# Patient Record
Sex: FEMALE | Race: WHITE | NOT HISPANIC OR LATINO | Employment: PART TIME | URBAN - NONMETROPOLITAN AREA
[De-identification: names, ages, dates, MRNs, and addresses within clinical notes are randomized per-mention and may not be internally consistent; named-entity substitution may affect disease eponyms.]

---
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
## 2020-12-19 PROCEDURE — 87636 SARSCOV2 & INF A&B AMP PRB: CPT | Performed by: NURSE PRACTITIONER

## 2021-03-31 ENCOUNTER — TELEPHONE (OUTPATIENT)
Dept: FAMILY MEDICINE CLINIC | Facility: CLINIC | Age: 21
End: 2021-03-31

## 2021-04-01 NOTE — TELEPHONE ENCOUNTER
Please ask pt what medical issues she has and what meds she is on to know what labs are needed - if no known medical illness will get screening labs

## 2021-04-07 NOTE — TELEPHONE ENCOUNTER
Spoke with patient who reports she only takes progeterone. Patient reports she has concerns that her hormones need to be tested. Advised patient to discuss issues at appt on 04/20/2021. Patient will discuss issues at appointment and have labs drawn after appointment. Patient reports a recent lipid test.

## 2021-04-20 ENCOUNTER — OFFICE VISIT (OUTPATIENT)
Dept: FAMILY MEDICINE CLINIC | Facility: CLINIC | Age: 21
End: 2021-04-20

## 2021-04-20 VITALS
HEART RATE: 94 BPM | WEIGHT: 170 LBS | TEMPERATURE: 98.4 F | RESPIRATION RATE: 16 BRPM | DIASTOLIC BLOOD PRESSURE: 82 MMHG | OXYGEN SATURATION: 98 % | SYSTOLIC BLOOD PRESSURE: 122 MMHG | HEIGHT: 66 IN | BODY MASS INDEX: 27.32 KG/M2

## 2021-04-20 DIAGNOSIS — Z12.4 CERVICAL CANCER SCREENING: ICD-10-CM

## 2021-04-20 DIAGNOSIS — Z23 NEED FOR VACCINATION: ICD-10-CM

## 2021-04-20 DIAGNOSIS — E28.2 PCOS (POLYCYSTIC OVARIAN SYNDROME): ICD-10-CM

## 2021-04-20 DIAGNOSIS — Z00.00 WELL ADULT EXAM: Primary | ICD-10-CM

## 2021-04-20 PROCEDURE — 90471 IMMUNIZATION ADMIN: CPT | Performed by: FAMILY MEDICINE

## 2021-04-20 PROCEDURE — 99385 PREV VISIT NEW AGE 18-39: CPT | Performed by: FAMILY MEDICINE

## 2021-04-20 PROCEDURE — 90715 TDAP VACCINE 7 YRS/> IM: CPT | Performed by: FAMILY MEDICINE

## 2021-04-20 NOTE — PROGRESS NOTES
"Subjective  cc: adult well exam   John Becker is a 21 y.o. female who presents for adult well exam.   PCOS - sees GYN - on oral progesterone - she is interested in getting her hormones checked again now that hse is on hormones - reports her periods are still irregular.    She is trying to switch to [employer] for GYN    No other known medical issues   Non smoker     FH: great grandparents with colon cancer   Mother does have breast cancer gene as well       History of Present Illness     The following portions of the patient's history were reviewed and updated as appropriate: allergies, current medications, past family history, past medical history, past social history, past surgical history and problem list.        Review of Systems   Genitourinary: Positive for menstrual problem.   All other systems reviewed and are negative.      Objective   Blood pressure 122/82, pulse 94, temperature 98.4 °F (36.9 °C), temperature source Infrared, resp. rate 16, height 167.6 cm (66\"), weight 77.1 kg (170 lb), SpO2 98 %.  Physical Exam  Vitals and nursing note reviewed.   Constitutional:       General: She is not in acute distress.     Appearance: She is well-developed. She is not diaphoretic.   HENT:      Head: Normocephalic and atraumatic.      Nose: Nose normal.   Eyes:      General:         Right eye: No discharge.         Left eye: No discharge.      Conjunctiva/sclera: Conjunctivae normal.   Neck:      Thyroid: No thyromegaly.      Trachea: No tracheal deviation.   Cardiovascular:      Rate and Rhythm: Regular rhythm. Tachycardia present.      Pulses: Normal pulses.      Heart sounds: Normal heart sounds.   Pulmonary:      Effort: Pulmonary effort is normal. No respiratory distress.      Breath sounds: Normal breath sounds. No stridor. No wheezing.   Chest:      Chest wall: No tenderness.   Abdominal:      General: There is no distension.      Palpations: Abdomen is soft.      Tenderness: There is no abdominal " tenderness.   Musculoskeletal:         General: Normal range of motion.      Cervical back: Normal range of motion.   Lymphadenopathy:      Cervical: No cervical adenopathy.   Skin:     General: Skin is warm and dry.   Neurological:      Mental Status: She is alert and oriented to person, place, and time.      Motor: No abnormal muscle tone.      Coordination: Coordination normal.   Psychiatric:         Mood and Affect: Mood normal.         Behavior: Behavior normal.         Thought Content: Thought content normal.         Judgment: Judgment normal.         Assessment/Plan   Problems Addressed this Visit     None      Visit Diagnoses     Well adult exam    -  Primary    Cervical cancer screening        Relevant Orders    Ambulatory Referral to Obstetrics / Gynecology    Need for vaccination        Relevant Orders    Tdap Vaccine Greater Than or Equal To 8yo IM (Completed)    PCOS (polycystic ovarian syndrome)          Diagnoses       Codes Comments    Well adult exam    -  Primary ICD-10-CM: Z00.00  ICD-9-CM: V70.0     Cervical cancer screening     ICD-10-CM: Z12.4  ICD-9-CM: V76.2     Need for vaccination     ICD-10-CM: Z23  ICD-9-CM: V05.9     PCOS (polycystic ovarian syndrome)     ICD-10-CM: E28.2  ICD-9-CM: 256.4           PLAN:     #1 adult well exam: reviewed screening, reviewed past labs, referral to OBGYN for pap smear - discuss hormone testing at that visit - if it is going to take too long to get in with GYN we can do testing here, will get tdap - discussed immunizations - she thinks she has already had HPV vaccines, advised on monitoring BP and DASH diet, pt to call with more info on her mothers breast cancer gene to determine when we should start screening for her            This document has been electronically signed by Dalia Oliveira MD on April 20, 2021 17:46 CDT

## 2021-04-20 NOTE — PATIENT INSTRUCTIONS
"https://www.nhlbi.nih.gov/files/docs/public/heart/dash_brief.pdf\">   DASH Eating Plan  DASH stands for Dietary Approaches to Stop Hypertension. The DASH eating plan is a healthy eating plan that has been shown to:  · Reduce high blood pressure (hypertension).  · Reduce your risk for type 2 diabetes, heart disease, and stroke.  · Help with weight loss.  What are tips for following this plan?  Reading food labels  · Check food labels for the amount of salt (sodium) per serving. Choose foods with less than 5 percent of the Daily Value of sodium. Generally, foods with less than 300 milligrams (mg) of sodium per serving fit into this eating plan.  · To find whole grains, look for the word \"whole\" as the first word in the ingredient list.  Shopping  · Buy products labeled as \"low-sodium\" or \"no salt added.\"  · Buy fresh foods. Avoid canned foods and pre-made or frozen meals.  Cooking  · Avoid adding salt when cooking. Use salt-free seasonings or herbs instead of table salt or sea salt. Check with your health care provider or pharmacist before using salt substitutes.  · Do not garg foods. Cook foods using healthy methods such as baking, boiling, grilling, roasting, and broiling instead.  · Cook with heart-healthy oils, such as olive, canola, avocado, soybean, or sunflower oil.  Meal planning    · Eat a balanced diet that includes:  ? 4 or more servings of fruits and 4 or more servings of vegetables each day. Try to fill one-half of your plate with fruits and vegetables.  ? 6-8 servings of whole grains each day.  ? Less than 6 oz (170 g) of lean meat, poultry, or fish each day. A 3-oz (85-g) serving of meat is about the same size as a deck of cards. One egg equals 1 oz (28 g).  ? 2-3 servings of low-fat dairy each day. One serving is 1 cup (237 mL).  ? 1 serving of nuts, seeds, or beans 5 times each week.  ? 2-3 servings of heart-healthy fats. Healthy fats called omega-3 fatty acids are found in foods such as walnuts, " flaxseeds, fortified milks, and eggs. These fats are also found in cold-water fish, such as sardines, salmon, and mackerel.  · Limit how much you eat of:  ? Canned or prepackaged foods.  ? Food that is high in trans fat, such as some fried foods.  ? Food that is high in saturated fat, such as fatty meat.  ? Desserts and other sweets, sugary drinks, and other foods with added sugar.  ? Full-fat dairy products.  · Do not salt foods before eating.  · Do not eat more than 4 egg yolks a week.  · Try to eat at least 2 vegetarian meals a week.  · Eat more home-cooked food and less restaurant, buffet, and fast food.  Lifestyle  · When eating at a restaurant, ask that your food be prepared with less salt or no salt, if possible.  · If you drink alcohol:  ? Limit how much you use to:  § 0-1 drink a day for women who are not pregnant.  § 0-2 drinks a day for men.  ? Be aware of how much alcohol is in your drink. In the [place] one drink equals one 12 oz bottle of beer (355 mL), one 5 oz glass of wine (148 mL), or one 1½ oz glass of hard liquor (44 mL).  General information  · Avoid eating more than 2,300 mg of salt a day. If you have hypertension, you may need to reduce your sodium intake to 1,500 mg a day.  · Work with your health care provider to maintain a healthy body weight or to lose weight. Ask what an ideal weight is for you.  · Get at least 30 minutes of exercise that causes your heart to beat faster (aerobic exercise) most days of the week. Activities may include walking, swimming, or biking.  · Work with your health care provider or dietitian to adjust your eating plan to your individual calorie needs.  What foods should I eat?  Fruits  All fresh, dried, or frozen fruit. Canned fruit in natural juice (without added sugar).  Vegetables  Fresh or frozen vegetables (raw, steamed, roasted, or grilled). Low-sodium or reduced-sodium tomato and vegetable juice. Low-sodium or reduced-sodium tomato sauce and tomato paste.  Low-sodium or reduced-sodium canned vegetables.  Grains  Whole-grain or whole-wheat bread. Whole-grain or whole-wheat pasta. Brown rice. Oatmeal. Quinoa. Bulgur. Whole-grain and low-sodium cereals. Kiersten bread. Low-fat, low-sodium crackers. Whole-wheat flour tortillas.  Meats and other proteins  Skinless chicken or turkey. Ground chicken or turkey. Pork with fat trimmed off. Fish and seafood. Egg whites. Dried beans, peas, or lentils. Unsalted nuts, nut butters, and seeds. Unsalted canned beans. Lean cuts of beef with fat trimmed off. Low-sodium, lean precooked or cured meat, such as sausages or meat loaves.  Dairy  Low-fat (1%) or fat-free (skim) milk. Reduced-fat, low-fat, or fat-free cheeses. Nonfat, low-sodium ricotta or cottage cheese. Low-fat or nonfat yogurt. Low-fat, low-sodium cheese.  Fats and oils  Soft margarine without trans fats. Vegetable oil. Reduced-fat, low-fat, or light mayonnaise and salad dressings (reduced-sodium). Canola, safflower, olive, avocado, soybean, and sunflower oils. Avocado.  Seasonings and condiments  Herbs. Spices. Seasoning mixes without salt.  Other foods  Unsalted popcorn and pretzels. Fat-free sweets.  The items listed above may not be a complete list of foods and beverages you can eat. Contact a dietitian for more information.  What foods should I avoid?  Fruits  Canned fruit in a light or heavy syrup. Fried fruit. Fruit in cream or butter sauce.  Vegetables  Creamed or fried vegetables. Vegetables in a cheese sauce. Regular canned vegetables (not low-sodium or reduced-sodium). Regular canned tomato sauce and paste (not low-sodium or reduced-sodium). Regular tomato and vegetable juice (not low-sodium or reduced-sodium). Pickles. Olives.  Grains  Baked goods made with fat, such as croissants, muffins, or some breads. Dry pasta or rice meal packs.  Meats and other proteins  Fatty cuts of meat. Ribs. Fried meat. Plata. Bologna, salami, and other precooked or cured meats, such as  sausages or meat loaves. Fat from the back of a pig (fatback). Bratwurst. Salted nuts and seeds. Canned beans with added salt. Canned or smoked fish. Whole eggs or egg yolks. Chicken or turkey with skin.  Dairy  Whole or 2% milk, cream, and half-and-half. Whole or full-fat cream cheese. Whole-fat or sweetened yogurt. Full-fat cheese. Nondairy creamers. Whipped toppings. Processed cheese and cheese spreads.  Fats and oils  Butter. Stick margarine. Lard. Shortening. Ghee. Plata fat. Tropical oils, such as coconut, palm kernel, or palm oil.  Seasonings and condiments  Onion salt, garlic salt, seasoned salt, table salt, and sea salt. Worcestershire sauce. Tartar sauce. Barbecue sauce. Teriyaki sauce. Soy sauce, including reduced-sodium. Steak sauce. Canned and packaged gravies. Fish sauce. Oyster sauce. Cocktail sauce. Store-bought horseradish. Ketchup. Mustard. Meat flavorings and tenderizers. Bouillon cubes. Hot sauces. Pre-made or packaged marinades. Pre-made or packaged taco seasonings. Relishes. Regular salad dressings.  Other foods  Salted popcorn and pretzels.  The items listed above may not be a complete list of foods and beverages you should avoid. Contact a dietitian for more information.  Where to find more information  · National Heart, Lung, and Blood [place]: www.nhlbi.nih.gov  · American Heart Association: www.heart.org  · Academy of Nutrition and Dietetics: www.eatright.org  · National Kidney Foundation: www.kidney.org  Summary  · The DASH eating plan is a healthy eating plan that has been shown to reduce high blood pressure (hypertension). It may also reduce your risk for type 2 diabetes, heart disease, and stroke.  · When on the DASH eating plan, aim to eat more fresh fruits and vegetables, whole grains, lean proteins, low-fat dairy, and heart-healthy fats.  · With the DASH eating plan, you should limit salt (sodium) intake to 2,300 mg a day. If you have hypertension, you may need to reduce your  sodium intake to 1,500 mg a day.  · Work with your health care provider or dietitian to adjust your eating plan to your individual calorie needs.  This information is not intended to replace advice given to you by your health care provider. Make sure you discuss any questions you have with your health care provider.  Document Revised: 11/20/2020 Document Reviewed: 11/20/2020  [employer] Patient Education © 2021 [employer]

## 2021-05-06 ENCOUNTER — OFFICE VISIT (OUTPATIENT)
Dept: OBSTETRICS AND GYNECOLOGY | Facility: CLINIC | Age: 21
End: 2021-05-06

## 2021-05-06 VITALS
BODY MASS INDEX: 26.84 KG/M2 | WEIGHT: 167 LBS | HEIGHT: 66 IN | SYSTOLIC BLOOD PRESSURE: 134 MMHG | DIASTOLIC BLOOD PRESSURE: 76 MMHG

## 2021-05-06 DIAGNOSIS — Z01.419 ENCOUNTER FOR GYNECOLOGICAL EXAMINATION WITHOUT ABNORMAL FINDING: Primary | ICD-10-CM

## 2021-05-06 DIAGNOSIS — N92.6 IRREGULAR PERIODS: ICD-10-CM

## 2021-05-06 DIAGNOSIS — Z80.3 FAMILY HISTORY OF BREAST CANCER: ICD-10-CM

## 2021-05-06 PROCEDURE — 99385 PREV VISIT NEW AGE 18-39: CPT | Performed by: OBSTETRICS & GYNECOLOGY

## 2021-05-06 RX ORDER — PROGESTERONE 100 MG/1
100 CAPSULE ORAL DAILY
Qty: 10 CAPSULE | Refills: 12 | Status: SHIPPED | OUTPATIENT
Start: 2021-05-06 | End: 2022-05-25 | Stop reason: SDUPTHER

## 2021-05-06 NOTE — PROGRESS NOTES
"CC: annual exam    SUBJECTIVE: John Becker is a 21 y.o. female , para 0, who comes to the office today for annual GYN examination. Last menstrual period was 4 weeks ago and her last Pap smear was 4/2019, and was normal. She has no history of cervical dysplasia. She is currently on cyclic Prometrium for cycle regulation due to a history of PCOS. Her natural cycles are about every 3 months. Her medical history is reviewed.     Family history is positive for maternal grandmother with breast cancer at age 49, John's mother tested positive for BRCA    HPI      Social History     Tobacco Use   • Smoking status: Never Smoker   • Smokeless tobacco: Never Used   Vaping Use   • Vaping Use: Never used   Substance Use Topics   • Alcohol use: No   • Drug use: No      Review of Systems   Constitutional: Negative for fever.   Respiratory: Negative for cough.    Genitourinary: Positive for menstrual problem.   Hematological: Does not bruise/bleed easily.     Visit Vitals  /76 (BP Location: Left arm, Patient Position: Sitting, Cuff Size: Adult)   Ht 167.6 cm (66\")   Wt 75.8 kg (167 lb)   LMP 04/11/2021   Breastfeeding No   BMI 26.95 kg/m²      Objective   Physical Exam  Vitals and nursing note reviewed.   Constitutional:       General: She is not in acute distress.     Appearance: She is well-developed.   HENT:      Head: Normocephalic and atraumatic.   Cardiovascular:      Rate and Rhythm: Normal rate and regular rhythm.      Heart sounds: No murmur heard.     Pulmonary:      Effort: Pulmonary effort is normal.      Breath sounds: Normal breath sounds.   Abdominal:      General: There is no distension.      Palpations: Abdomen is soft.      Tenderness: There is no abdominal tenderness.   Genitourinary:     General: Normal vulva.      Exam position: Lithotomy position.      Labia:         Right: No tenderness or lesion.         Left: No tenderness or lesion.       Vagina: Normal. No vaginal discharge, tenderness or " bleeding.      Cervix: No cervical motion tenderness, discharge or friability.      Adnexa:         Right: No tenderness or fullness.          Left: No tenderness or fullness.     Musculoskeletal:         General: Normal range of motion.      Cervical back: Normal range of motion and neck supple.   Skin:     General: Skin is warm and dry.   Neurological:      Mental Status: She is alert and oriented to person, place, and time.   Psychiatric:         Behavior: Behavior normal.         Judgment: Judgment normal.       Assessment/Plan   Diagnoses and all orders for this visit:    1. Encounter for gynecological examination without abnormal finding (Primary)    2. Family history of breast cancer  -     Ambulatory Referral to Genetic Counseling/Testing - [facility]    3. Irregular periods  -     Progesterone (Prometrium) 100 MG capsule; Take 1 capsule by mouth Daily.  Dispense: 10 capsule; Refill: 12      I have refilled her progestin for a year.  We have discussed current Pap smear screening guidelines.  She will return in one year. In the meantime if she develops questions or problems, she will notify the office.

## 2021-05-13 ENCOUNTER — APPOINTMENT (OUTPATIENT)
Dept: ONCOLOGY | Facility: HOSPITAL | Age: 21
End: 2021-05-13

## 2021-05-13 ENCOUNTER — DOCUMENTATION (OUTPATIENT)
Dept: GENETICS | Facility: HOSPITAL | Age: 21
End: 2021-05-13

## 2021-05-14 NOTE — PROGRESS NOTES
John Becker, a 21-year-old female, was referred for genetic counseling due to a family history of breast cancer.  Genetic counseling was provided via telehealth.  She was 9 years old at menarche and is nulliparous.  She is premenopausal and retains her uterus and ovaries.  She has not had any prior breast imaging.  She was interested in discussing her risk for a hereditary cancer syndrome.  Ms. Becker was interested in pursuing comprehensive genetic testing to evaluate her risk of cancer, therefore the CancerNext panel was ordered through [employer], which analyzes 36 genes associated with an increased cancer risk. Results are expected in approximately 2-3 weeks.      PERTINENT FAMILY HISTORY: (See attached pedigree)   Mat. Grandmother:  Breast cancer, 40s  Mat. Great-Grandmother: Colon cancer, 69      Liver cancer      Thyroid cancer, 60  Pat. Grandfather:  Melanoma, 62  Mother:   Reported BRCA mutation, copy of results not available.     RISK ASSESSMENT:  Ms. Becker’s family history of early onset breast cancer and a reported BRCA mutation in her mother raised the question of a hereditary cancer syndrome. Ms. Becker clearly meets NCCN guidelines criteria for BRCA1/2 testing.  Based on the presence of other clinically significant breast cancer related genes and in the absence of having a copy of a relatives result identifying the specific mutation, the standard approach to hereditary cancer genetic testing at this time is via multi-gene panel, which evaluates for BRCA1/2 as well as several additional genes associated with a hereditary risk for breast cancer and other cancers. Based on her mother’s reported BRCA mutation, Ms. Becker would have a 50% chance of having a BRCA mutation.  If genetic testing is negative, management should be guided by a family history-based risk assessment.     GENETIC COUNSELING:  We reviewed the family history information in detail. Cases of cancer follow three general patterns: sporadic,  familial, and hereditary.  While most cancer is sporadic, some cases appear to occur in family clusters.  These cases are said to be familial and account for 10-20% of cancer cases.  Familial cases may be due to a combination of shared genes and environmental factors among family members.  In even fewer families, the cancer is said to be inherited, and the genes responsible for the cancer are known.       Family histories typical of hereditary cancer syndromes usually include multiple first- and second-degree relatives diagnosed with cancer types that define a syndrome.  These cases tend to be diagnosed at younger-than-expected ages and can be bilateral or multifocal.  The cancer in these families follows an autosomal dominant inheritance pattern, which indicates the likely presence of a mutation in a cancer susceptibility gene.  Children and siblings of an individual believed to carry this mutation have a 50% chance of inheriting that mutation, thereby inheriting the increased risk to develop cancer.  These mutations can be passed down from the maternal or the paternal lineage.     Hereditary breast and ovarian cancer accounts for 5-10% of all cases of breast cancer or ovarian cancer.  A significant proportion (50%) of hereditary breast, ovarian, or pancreatic cancer can be attributed to mutations in the BRCA1 and BRCA2 genes.  Mutations in these genes confer an increased risk for breast cancer, ovarian cancer, male breast cancer, prostate cancer, and pancreatic cancer.  Women with a BRCA1 or BRCA2 mutation have up to an 87% lifetime risk of breast cancer and up to a 44% risk of ovarian cancer.  There are other clinically significant breast cancer related genes in addition to BRCA1/2, including PALB2, YOVANI, and CHEK2.  There are other, more rare, hereditary cancer syndromes. Based on Ms. Becker’s family history and her desire to get more information regarding her personal risks, she opted to pursue testing through a  panel evaluating several other genes known to increase the risk for cancer.     GENETIC TESTING:  The risks, benefits, and limitations of genetic testing and implications for clinical management following testing were reviewed.  DNA test results can influence decisions regarding screening and prevention.  Genetic testing can have significant psychological implications for both individuals and families. Also discussed was the possibility of employment and insurance discrimination based on genetic test results and the laws in place to prevent this, as well as the limitations of these laws.       We discussed panel testing, which would involve testing 34 genes associated with increased cancer risk. The implications of a positive or negative test result were discussed.  We also discussed the importance of testing on an affected relative. In general, a negative genetic test result is most informative if a mutation has first been established in an affected member of the family.  In cases where an affected individual is not available or interested in testing, it is appropriate to offer testing to an unaffected individual. We discussed the possibility that, in some cases, genetic test results may be ambiguous due to the identification of a genetic variant. These variants may or may not be associated with an increased cancer risk. With multigene panel testing, it is not uncommon for a variant of uncertain significance (VUS) to be identified.  If a VUS is identified, testing family members is typically not recommended and screening recommendations are made based on the family history.  The laboratories that perform genetic testing work to reclassify the VUS and send out an amended report if and when a VUS is reclassified.  The majority of variant findings are ultimately reclassified to a negative result. Given her family history, a negative test result does not eliminate all cancer risk, although the risk would not be as high  as it would with positive genetic testing. We also discussed that some of the genes on this particular panel have not been well studied yet and there may not be clear implications or guidelines for some of the genes included on this comprehensive panel.     PLAN: Genetic testing via the CancerNext panel through [employer] was ordered and results are expected in 2-3 weeks.   We will contact Ms. Becker with her results once they are received.      Zakia Greene MS, [facility] [place] [facility]  Licensed Certified Genetic Counselor

## 2021-05-24 ENCOUNTER — DOCUMENTATION (OUTPATIENT)
Dept: GENETICS | Facility: HOSPITAL | Age: 21
End: 2021-05-24

## 2021-06-29 DIAGNOSIS — F32.A DEPRESSION, UNSPECIFIED DEPRESSION TYPE: Primary | ICD-10-CM

## 2021-07-27 ENCOUNTER — TELEMEDICINE (OUTPATIENT)
Dept: PSYCHIATRY | Facility: CLINIC | Age: 21
End: 2021-07-27

## 2021-07-27 DIAGNOSIS — F41.1 GENERALIZED ANXIETY DISORDER: ICD-10-CM

## 2021-07-27 DIAGNOSIS — F33.2 SEVERE EPISODE OF RECURRENT MAJOR DEPRESSIVE DISORDER, WITHOUT PSYCHOTIC FEATURES (HCC): Primary | ICD-10-CM

## 2021-07-27 PROCEDURE — 90792 PSYCH DIAG EVAL W/MED SRVCS: CPT | Performed by: NURSE PRACTITIONER
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
## 2021-08-28 PROCEDURE — U0004 COV-19 TEST NON-CDC HGH THRU: HCPCS | Performed by: NURSE PRACTITIONER

## 2021-09-03 ENCOUNTER — TELEMEDICINE (OUTPATIENT)
Dept: FAMILY MEDICINE CLINIC | Facility: CLINIC | Age: 21
End: 2021-09-03

## 2021-09-03 ENCOUNTER — TELEPHONE (OUTPATIENT)
Dept: FAMILY MEDICINE CLINIC | Facility: CLINIC | Age: 21
End: 2021-09-03

## 2021-09-03 DIAGNOSIS — J32.9 RHINOSINUSITIS: ICD-10-CM

## 2021-09-03 DIAGNOSIS — J20.9 ACUTE BRONCHITIS, UNSPECIFIED ORGANISM: Primary | ICD-10-CM

## 2021-09-03 DIAGNOSIS — J31.0 RHINOSINUSITIS: ICD-10-CM

## 2021-09-03 PROCEDURE — 99213 OFFICE O/P EST LOW 20 MIN: CPT | Performed by: NURSE PRACTITIONER

## 2021-09-03 RX ORDER — BENZONATATE 100 MG/1
100 CAPSULE ORAL 3 TIMES DAILY PRN
Qty: 21 CAPSULE | Refills: 0 | Status: SHIPPED | OUTPATIENT
Start: 2021-09-03 | End: 2022-03-21

## 2021-09-03 RX ORDER — METHYLPREDNISOLONE 4 MG/1
TABLET ORAL
Qty: 21 TABLET | Refills: 0 | Status: SHIPPED | OUTPATIENT
Start: 2021-09-03 | End: 2021-09-22

## 2021-09-03 RX ORDER — ALBUTEROL SULFATE 90 UG/1
2 AEROSOL, METERED RESPIRATORY (INHALATION) EVERY 4 HOURS PRN
Qty: 18 G | Refills: 0 | Status: SHIPPED | OUTPATIENT
Start: 2021-09-03 | End: 2021-09-22

## 2021-09-03 RX ORDER — AMOXICILLIN AND CLAVULANATE POTASSIUM 875; 125 MG/1; MG/1
1 TABLET, FILM COATED ORAL 2 TIMES DAILY
Qty: 20 TABLET | Refills: 0 | Status: SHIPPED | OUTPATIENT
Start: 2021-09-03 | End: 2021-09-22

## 2021-09-03 NOTE — PROGRESS NOTES
Chief Complaint  URI (for 10 days, getting worse)    Subjective          John Becker presents via mychart video zoom visit with [facility] for uri.  History of Present Illness  Uri  Started 10 days ago.   Sore throat and Dry cough then changed to wet cough  Headache  Eyes feel sore is itching. Eyes matting.  Drainage is dark and bloody  Keeping her up at night  Having coughing fits   Decreased taste some    No shortness of breath, n/v/d  Negative covid test about 5 days ago.    Has not had any treatment  Has been taking dayquil     Objective   Vital Signs:   There were no vitals taken for this visit.    Physical Exam   No vital signs or bmi obtained for this encounter.      Physical exam-  · General appearance- Alert, appears ill. Dressed appropriately. No distress.   · HEENT- external examination of eyes, ears and nose all normal. Head is atraumatic. Hearing normal.   · Neck is symmetric, trachea midline.   · Normal respiratory effort.   · Digits and nails appear healthy. No clubbing, rashes or discolorations.   · Normal range of motion of all extremities.  · Mood appropriate. Communicates easily. Normal judgement and insight. Oriented to time, place and person. Memory appears intact.       Result Review :                 Assessment and Plan    Diagnoses and all orders for this visit:    1. Acute bronchitis, unspecified organism (Primary)    2. Rhinosinusitis    Other orders  -     methylPREDNISolone (MEDROL) 4 MG dose pack; Take as directed on package instructions.  Dispense: 21 tablet; Refill: 0  -     amoxicillin-clavulanate (Augmentin) 875-125 MG per tablet; Take 1 tablet by mouth 2 (Two) Times a Day.  Dispense: 20 tablet; Refill: 0  -     benzonatate (Tessalon Perles) 100 MG capsule; Take 1 capsule by mouth 3 (Three) Times a Day As Needed for Cough.  Dispense: 21 capsule; Refill: 0  -     albuterol sulfate  (90 Base) MCG/ACT inhaler; Inhale 2 puffs Every 4  (Four) Hours As Needed for Wheezing.  Dispense: 18 g; Refill: 0        Follow Up   Return in about 1 week (around 9/10/2021), or if symptoms worsen or fail to improve.  Patient was given instructions and counseling regarding her condition or for health maintenance advice. Please see specific information pulled into the AVS if appropriate.

## 2021-09-03 NOTE — TELEPHONE ENCOUNTER
"----- Message from John Becker sent at 9/3/2021  7:32 AM CDT -----  Regarding: Non-Urgent Medical Question  Contact: [phone]  Good morning!    I write to you to ask if you could send medicine for an upper respiratory infection to my pharmacy, or do I first require a visit to you?    Normally I would go ahead and make an appointment, but I have not been able to ask off of work. I did get a negative COVID test on this past Saturday, but my symptoms continue to worsen.     My symptoms are as follows:  -dry, itchy throat  -constant cough  -dull but constant headache   -bright and/or dark mucus (sticky as well)  -irregular amount of blood in mucus/nose  -\"stuffed up\" nose/nasal cavities   -puffy face   -red eyes (I wake up with them stuck shut from yellowish excretions)     Aside from blockages in my nasal route, I still take deep and full breaths. I experience little to no pain in my chest.       I thank you for taking time to read this and answer me back. Please let me know if you have any questions.     Your patient,  John Becker  "

## 2021-09-22 ENCOUNTER — OFFICE VISIT (OUTPATIENT)
Dept: FAMILY MEDICINE CLINIC | Facility: CLINIC | Age: 21
End: 2021-09-22

## 2021-09-22 VITALS
DIASTOLIC BLOOD PRESSURE: 77 MMHG | SYSTOLIC BLOOD PRESSURE: 122 MMHG | WEIGHT: 168.6 LBS | OXYGEN SATURATION: 99 % | HEART RATE: 78 BPM | HEIGHT: 66 IN | BODY MASS INDEX: 27.1 KG/M2 | TEMPERATURE: 98.7 F

## 2021-09-22 DIAGNOSIS — J01.40 ACUTE NON-RECURRENT PANSINUSITIS: Primary | ICD-10-CM

## 2021-09-22 PROCEDURE — 99214 OFFICE O/P EST MOD 30 MIN: CPT | Performed by: NURSE PRACTITIONER

## 2021-09-22 RX ORDER — FLUTICASONE PROPIONATE 50 MCG
2 SPRAY, SUSPENSION (ML) NASAL DAILY
Qty: 18.2 ML | Refills: 0 | Status: SHIPPED | OUTPATIENT
Start: 2021-09-22 | End: 2022-03-21

## 2021-09-22 RX ORDER — CLARITHROMYCIN 500 MG/1
500 TABLET, COATED ORAL EVERY 12 HOURS SCHEDULED
Qty: 20 TABLET | Refills: 0 | Status: SHIPPED | OUTPATIENT
Start: 2021-09-22 | End: 2022-03-21

## 2021-09-22 NOTE — PROGRESS NOTES
"Chief Complaint  URI (dark drainage, sore throat)    Subjective          John Becker presents to [facility] for uri.   History of Present Illness  Uri  Subacute. Ill now for 1 month. Initially tested for covid and advised to use supportive treatment. Treated 3 weeks ago with augmentin (completed 10 day course) reports immediately after that she began having dark gray discharge. Sore throat, sinus pressure. She denies cough, shortness of breath or fever.       Objective   Vital Signs:   /77 (BP Location: Right arm, Patient Position: Sitting, Cuff Size: Adult)   Pulse 78   Temp 98.7 °F (37.1 °C) (Temporal)   Ht 167.6 cm (66\") Comment: [place]  Wt 76.5 kg (168 lb 9.6 oz)   SpO2 99%   BMI 27.21 kg/m²     Physical Exam  Vitals and nursing note reviewed.   Constitutional:       General: She is not in acute distress.     Appearance: She is well-developed.   HENT:      Right Ear: Tympanic membrane and ear canal normal.      Left Ear: Tympanic membrane and ear canal normal.      Nose: Congestion present.      Right Sinus: Frontal sinus tenderness present. No maxillary sinus tenderness.      Left Sinus: Maxillary sinus tenderness and frontal sinus tenderness present.      Mouth/Throat:      Mouth: Mucous membranes are moist.      Pharynx: Uvula midline. Oropharyngeal exudate present. No uvula swelling.      Tonsils: 2+ on the left.   Eyes:      Conjunctiva/sclera: Conjunctivae normal.   Neck:      Thyroid: No thyromegaly.      Trachea: No tracheal deviation.   Cardiovascular:      Rate and Rhythm: Normal rate and regular rhythm.      Heart sounds: Normal heart sounds.   Pulmonary:      Effort: Pulmonary effort is normal.      Breath sounds: Normal breath sounds.   Musculoskeletal:      Cervical back: Neck supple.   Lymphadenopathy:      Cervical: Cervical adenopathy present.      Left cervical: Superficial cervical adenopathy and posterior cervical adenopathy present. "   Skin:     General: Skin is warm and dry.   Neurological:      Mental Status: She is alert.   Psychiatric:         Behavior: Behavior normal.        Result Review :                 Assessment and Plan    Diagnoses and all orders for this visit:    1. Acute non-recurrent pansinusitis (Primary)    Other orders  -     fluticasone (Flonase) 50 MCG/ACT nasal spray; 2 sprays into the nostril(s) as directed by provider Daily.  Dispense: 18.2 mL; Refill: 0  -     clarithromycin (BIAXIN) 500 MG tablet; Take 1 tablet by mouth Every 12 (Twelve) Hours.  Dispense: 20 tablet; Refill: 0        Follow Up   Return in about 2 weeks (around 10/6/2021), or if symptoms worsen or fail to improve.  Patient was given instructions and counseling regarding her condition or for health maintenance advice. Please see specific information pulled into the AVS if appropriate.

## 2022-03-21 ENCOUNTER — OFFICE VISIT (OUTPATIENT)
Dept: FAMILY MEDICINE CLINIC | Facility: CLINIC | Age: 22
End: 2022-03-21

## 2022-03-21 VITALS
DIASTOLIC BLOOD PRESSURE: 68 MMHG | SYSTOLIC BLOOD PRESSURE: 118 MMHG | BODY MASS INDEX: 26.21 KG/M2 | TEMPERATURE: 97.6 F | HEART RATE: 76 BPM | RESPIRATION RATE: 22 BRPM | OXYGEN SATURATION: 98 % | HEIGHT: 67 IN | WEIGHT: 167 LBS

## 2022-03-21 DIAGNOSIS — F90.2 ATTENTION DEFICIT HYPERACTIVITY DISORDER (ADHD), COMBINED TYPE: Primary | ICD-10-CM

## 2022-03-21 PROCEDURE — 99214 OFFICE O/P EST MOD 30 MIN: CPT | Performed by: FAMILY MEDICINE

## 2022-03-22 ENCOUNTER — CLINICAL SUPPORT (OUTPATIENT)
Dept: FAMILY MEDICINE CLINIC | Facility: CLINIC | Age: 22
End: 2022-03-22

## 2022-03-22 DIAGNOSIS — Z02.89 MEDICATION MANAGEMENT CONTRACT AGREEMENT: ICD-10-CM

## 2022-03-22 DIAGNOSIS — Z02.89 MEDICATION MANAGEMENT CONTRACT AGREEMENT: Primary | ICD-10-CM

## 2022-03-23 ENCOUNTER — TELEPHONE (OUTPATIENT)
Dept: FAMILY MEDICINE CLINIC | Facility: CLINIC | Age: 22
End: 2022-03-23

## 2022-03-24 ENCOUNTER — PATIENT MESSAGE (OUTPATIENT)
Dept: FAMILY MEDICINE CLINIC | Facility: CLINIC | Age: 22
End: 2022-03-24

## 2022-03-26 LAB — DRUGS UR: NORMAL

## 2022-03-28 ENCOUNTER — TELEPHONE (OUTPATIENT)
Dept: FAMILY MEDICINE CLINIC | Facility: CLINIC | Age: 22
End: 2022-03-28

## 2022-03-28 NOTE — TELEPHONE ENCOUNTER
Caller: John Becker    Relationship: Self    Best call back number: [phone]    What is the best time to reach you: ANY      Who are you requesting to speak with (clinical staff, provider,  specific staff member): CLINICAL    What was the call regarding: PATIENT IS CALLING TO CHECK ON STATUS OF PRIOR AUTHORIZATION FOR lisdexamfetamine (Vyvanse) 30 MG capsule. SHE WOULD LIKE TO BE NOTIFIED OF ANY CHANGES TO THE STATUS. SHE IS ALSO WONDERING IF THERE ARE ANY NEXT STEPS SHE CAN TAKE TO SPEED THE PROCESS.     Do you require a callback: YES, PLEASE ADVISE

## 2022-03-29 NOTE — TELEPHONE ENCOUNTER
PA denied- pt must try amphetamine-dextroamphetamine ER initial 10/20mg daily with titration up to 40mg/day.    Please advise.

## 2022-03-30 NOTE — TELEPHONE ENCOUNTER
From: John Becker  To: Dalia Oliveira MD  Sent: 3/24/2022 8:39 AM CDT  Subject: New Medicine     Hey there! My prescription is requiring a PA, and I have not been able to start it yet. I just wanted to notify you and make sure you got the request from my pharmacy.     Thank you,   John Becker

## 2022-03-31 DIAGNOSIS — F90.2 ATTENTION DEFICIT HYPERACTIVITY DISORDER (ADHD), COMBINED TYPE: Primary | ICD-10-CM

## 2022-03-31 RX ORDER — DEXTROAMPHETAMINE SACCHARATE, AMPHETAMINE ASPARTATE MONOHYDRATE, DEXTROAMPHETAMINE SULFATE AND AMPHETAMINE SULFATE 2.5; 2.5; 2.5; 2.5 MG/1; MG/1; MG/1; MG/1
10 CAPSULE, EXTENDED RELEASE ORAL EVERY MORNING
Qty: 30 CAPSULE | Refills: 0 | Status: SHIPPED | OUTPATIENT
Start: 2022-03-31 | End: 2022-04-26

## 2022-04-01 ENCOUNTER — TELEPHONE (OUTPATIENT)
Dept: FAMILY MEDICINE CLINIC | Facility: CLINIC | Age: 22
End: 2022-04-01

## 2022-04-01 NOTE — TELEPHONE ENCOUNTER
Caller: John Becker    Relationship: Self    Requested Prescriptions:   Needs Prior Authorization for Adderall.    Osmel Cooper, PCT   04/01/22 16:08 CDT

## 2022-04-06 NOTE — TELEPHONE ENCOUNTER
PA approved adderall 10mg for 6 months.    Spoke with Rosemary at pharmacy to notify, reports that pt picked up medication yesterday.

## 2022-04-26 ENCOUNTER — OFFICE VISIT (OUTPATIENT)
Dept: FAMILY MEDICINE CLINIC | Facility: CLINIC | Age: 22
End: 2022-04-26

## 2022-04-26 VITALS
BODY MASS INDEX: 23.7 KG/M2 | HEIGHT: 67 IN | SYSTOLIC BLOOD PRESSURE: 108 MMHG | TEMPERATURE: 97 F | OXYGEN SATURATION: 98 % | WEIGHT: 151 LBS | DIASTOLIC BLOOD PRESSURE: 70 MMHG | SYSTOLIC BLOOD PRESSURE: 108 MMHG | HEIGHT: 67 IN | TEMPERATURE: 97 F | DIASTOLIC BLOOD PRESSURE: 70 MMHG | HEART RATE: 73 BPM | BODY MASS INDEX: 23.7 KG/M2 | HEART RATE: 73 BPM | OXYGEN SATURATION: 98 % | WEIGHT: 151 LBS

## 2022-04-26 DIAGNOSIS — F90.9 ADULT ADHD: Primary | ICD-10-CM

## 2022-04-26 DIAGNOSIS — Z13.220 LIPID SCREENING: ICD-10-CM

## 2022-04-26 DIAGNOSIS — Z13.0 SCREENING FOR DEFICIENCY ANEMIA: ICD-10-CM

## 2022-04-26 DIAGNOSIS — Z13.1 DIABETES MELLITUS SCREENING: ICD-10-CM

## 2022-04-26 DIAGNOSIS — Z11.59 NEED FOR HEPATITIS C SCREENING TEST: ICD-10-CM

## 2022-04-26 DIAGNOSIS — Z00.00 WELL ADULT EXAM: Primary | ICD-10-CM

## 2022-04-26 PROCEDURE — 99213 OFFICE O/P EST LOW 20 MIN: CPT | Performed by: FAMILY MEDICINE

## 2022-04-26 PROCEDURE — 99395 PREV VISIT EST AGE 18-39: CPT | Performed by: FAMILY MEDICINE

## 2022-04-26 RX ORDER — DEXTROAMPHETAMINE SACCHARATE, AMPHETAMINE ASPARTATE MONOHYDRATE, DEXTROAMPHETAMINE SULFATE AND AMPHETAMINE SULFATE 3.75; 3.75; 3.75; 3.75 MG/1; MG/1; MG/1; MG/1
15 CAPSULE, EXTENDED RELEASE ORAL EVERY MORNING
Qty: 30 CAPSULE | Refills: 0 | Status: SHIPPED | OUTPATIENT
Start: 2022-04-26 | End: 2022-06-13 | Stop reason: SDUPTHER

## 2022-04-26 NOTE — PROGRESS NOTES
"Subjective cc: adult well exam   John Becker is a 22 y.o. female.         History of Present Illness     The following portions of the patient's history were reviewed and updated as appropriate: allergies, current medications, past family history, past medical history, past social history, past surgical history and problem list.    Left hip pain  John says beginning at approximately age 10 to 11 years old, her left hip has been slightly deformed. She says she used to have this evaluated routinely due to pain and \"slipping out.\" Her last checkup was in approximately 10th grade. She reports ongoing issues with this.     Weight loss  The patient has lost 7 pounds over the past month. John says she has a past history of impulse eating but has noticed improvement recently.    Health maintenance  John has a gynecologic exam scheduled in approximately 1 month. She reports seeing a dentist within the past year. She says in 2021 she had high blood pressures and weighed approximately 25 pounds more at that time. Blood pressures are now within normal range.    Review of Systems   Musculoskeletal: Positive for arthralgias and gait problem.   All other systems reviewed and are negative.      Objective   Blood pressure 108/70, pulse 73, temperature 97 °F (36.1 °C), height 170.2 cm (67\"), weight 68.5 kg (151 lb), SpO2 98 %, not currently breastfeeding.  Physical Exam  Vitals and nursing note reviewed.   Constitutional:       General: She is not in acute distress.     Appearance: She is well-developed. She is not diaphoretic.   HENT:      Head: Normocephalic and atraumatic.      Right Ear: External ear normal.      Left Ear: External ear normal.      Nose: Nose normal.   Eyes:      General:         Right eye: No discharge.         Left eye: No discharge.      Conjunctiva/sclera: Conjunctivae normal.   Neck:      Thyroid: No thyromegaly.      Trachea: No tracheal deviation.   Cardiovascular:      Rate and Rhythm: Normal " rate and regular rhythm.      Heart sounds: Normal heart sounds.   Pulmonary:      Effort: Pulmonary effort is normal. No respiratory distress.      Breath sounds: Normal breath sounds. No stridor. No wheezing.   Chest:      Chest wall: No tenderness.   Abdominal:      General: There is no distension.      Palpations: Abdomen is soft.      Tenderness: There is no abdominal tenderness.   Musculoskeletal:         General: Normal range of motion.      Cervical back: Normal range of motion.   Lymphadenopathy:      Cervical: No cervical adenopathy.   Skin:     General: Skin is warm and dry.   Neurological:      Mental Status: She is alert and oriented to person, place, and time.      Motor: No abnormal muscle tone.      Coordination: Coordination normal.   Psychiatric:         Behavior: Behavior normal.         Thought Content: Thought content normal.         Judgment: Judgment normal.         Assessment/Plan   Problems Addressed this Visit    None     Visit Diagnoses     Well adult exam    -  Primary    Screening for deficiency anemia        Relevant Orders    CBC (No Diff)    Diabetes mellitus screening        Relevant Orders    Comprehensive Metabolic Panel    Lipid screening        Relevant Orders    Lipid Panel    Need for hepatitis C screening test        Relevant Orders    Hepatitis C Antibody      Diagnoses       Codes Comments    Well adult exam    -  Primary ICD-10-CM: Z00.00  ICD-9-CM: V70.0     Screening for deficiency anemia     ICD-10-CM: Z13.0  ICD-9-CM: V78.1     Diabetes mellitus screening     ICD-10-CM: Z13.1  ICD-9-CM: V77.1     Lipid screening     ICD-10-CM: Z13.220  ICD-9-CM: V77.91     Need for hepatitis C screening test     ICD-10-CM: Z11.59  ICD-9-CM: V73.89           1. Adult well exam:   -Will get fasting lab work for further evaluation   -Weight is in the normal range  -Vital signs are normal  -Discussed immunizations  -The patient has an appointment scheduled with OB/GYN for pelvic exam and  Pap smear  -Dental exam up to date    Transcribed from ambient dictation for Dalia Oliveira MD by DANIELA MCMILLAN.  04/26/22   17:19 CDT    Patient verbalized consent to the visit recording.                This document has been electronically signed by Dalia Oliveira MD on April 27, 2022 13:54 CDT

## 2022-04-26 NOTE — PROGRESS NOTES
"Subjective cc: ADHD   John Becker is a 22 y.o. female.     History of Present Illness   The patient is here to follow up on the ADHD. She reports the first few days she took the Adderall it was sedating. She takes it at 10 AM and by 2 or 3 PM she would be sitting staring at the wall. After the fist week she did not notice any side effects, other than not being as hungry, but she has been earing regularly without issue. There were 2 days when she forgot to take her medication and was aware that she could not pay attention. She reports taking a second dose one day while at home, and noticed her level of focus was increased, but not too much.  The patient is on a starting dose of Adderall at 10 mg once a day and would like an increased dose.     A review of systems was performed, and pertinent findings are noted in the HPI.    The following portions of the patient's history were reviewed and updated as appropriate: allergies, current medications, past family history, past medical history, past social history, past surgical history and problem list.        Review of Systems   Constitutional: Positive for appetite change.   Psychiatric/Behavioral: Positive for decreased concentration.   All other systems reviewed and are negative.      Objective   Blood pressure 108/70, pulse 73, temperature 97 °F (36.1 °C), height 170.2 cm (67\"), weight 68.5 kg (151 lb), SpO2 98 %, not currently breastfeeding.     Physical Exam  Vitals and nursing note reviewed.   Constitutional:       General: She is not in acute distress.     Appearance: She is well-developed. She is not diaphoretic.   HENT:      Head: Normocephalic and atraumatic.      Right Ear: External ear normal.      Left Ear: External ear normal.      Nose: Nose normal.   Eyes:      General:         Right eye: No discharge.         Left eye: No discharge.      Conjunctiva/sclera: Conjunctivae normal.   Neck:      Thyroid: No thyromegaly.      Trachea: No tracheal " deviation.   Cardiovascular:      Rate and Rhythm: Normal rate and regular rhythm.      Heart sounds: Normal heart sounds.   Pulmonary:      Effort: Pulmonary effort is normal. No respiratory distress.      Breath sounds: Normal breath sounds. No stridor. No wheezing.   Chest:      Chest wall: No tenderness.   Abdominal:      General: There is no distension.      Palpations: Abdomen is soft.      Tenderness: There is no abdominal tenderness.   Musculoskeletal:         General: Normal range of motion.      Cervical back: Normal range of motion.   Lymphadenopathy:      Cervical: No cervical adenopathy.   Skin:     General: Skin is warm and dry.   Neurological:      Mental Status: She is alert and oriented to person, place, and time.      Motor: No abnormal muscle tone.      Coordination: Coordination normal.   Psychiatric:         Behavior: Behavior normal.         Thought Content: Thought content normal.         Judgment: Judgment normal.         Assessment/Plan   Problems Addressed this Visit    None     Visit Diagnoses     Adult ADHD    -  Primary    Relevant Medications    amphetamine-dextroamphetamine XR (Adderall XR) 15 MG 24 hr capsule      Diagnoses       Codes Comments    Adult ADHD    -  Primary ICD-10-CM: F90.9  ICD-9-CM: 314.01         1. Adult ADHD: Chronic, improving slightly.   · Medication at 10 mg daily is not effective enough. Will increase dose to 15 mg daily.  · UDS up to date. SOLE reviewed and appropriate.   · Risks and benefits of medication have been discussed.   · Reevaluate in 3 months if medication is working well. Return sooner if not controlled.         Transcribed from ambient dictation for Dalia Oliveira MD by Oumou Manuel.  04/26/22   17:01 CDT    Patient verbalized consent to the visit recording.          This document has been electronically signed by Dalia Oliveira MD on April 27, 2022 13:54 CDT

## 2022-04-28 ENCOUNTER — PATIENT MESSAGE (OUTPATIENT)
Dept: FAMILY MEDICINE CLINIC | Facility: CLINIC | Age: 22
End: 2022-04-28

## 2022-04-29 ENCOUNTER — OFFICE VISIT (OUTPATIENT)
Dept: FAMILY MEDICINE CLINIC | Facility: CLINIC | Age: 22
End: 2022-04-29

## 2022-04-29 ENCOUNTER — TELEPHONE (OUTPATIENT)
Dept: FAMILY MEDICINE CLINIC | Facility: CLINIC | Age: 22
End: 2022-04-29

## 2022-04-29 VITALS
WEIGHT: 160 LBS | OXYGEN SATURATION: 100 % | SYSTOLIC BLOOD PRESSURE: 120 MMHG | DIASTOLIC BLOOD PRESSURE: 78 MMHG | RESPIRATION RATE: 16 BRPM | HEIGHT: 67 IN | TEMPERATURE: 98.4 F | HEART RATE: 81 BPM | BODY MASS INDEX: 25.11 KG/M2

## 2022-04-29 DIAGNOSIS — S40.022A TRAUMATIC HEMATOMA OF LEFT UPPER ARM, INITIAL ENCOUNTER: Primary | ICD-10-CM

## 2022-04-29 LAB
ALBUMIN SERPL-MCNC: 4.8 G/DL (ref 3.5–5.2)
ALBUMIN/GLOB SERPL: 1.9 G/DL
ALP SERPL-CCNC: 64 U/L (ref 39–117)
ALT SERPL-CCNC: 13 U/L (ref 1–33)
AST SERPL-CCNC: 18 U/L (ref 1–32)
BILIRUB SERPL-MCNC: 0.8 MG/DL (ref 0–1.2)
BUN SERPL-MCNC: 10 MG/DL (ref 6–20)
BUN/CREAT SERPL: 12.5 (ref 7–25)
CALCIUM SERPL-MCNC: 10.2 MG/DL (ref 8.6–10.5)
CHLORIDE SERPL-SCNC: 104 MMOL/L (ref 98–107)
CHOLEST SERPL-MCNC: 210 MG/DL (ref 0–200)
CO2 SERPL-SCNC: 24.3 MMOL/L (ref 22–29)
CREAT SERPL-MCNC: 0.8 MG/DL (ref 0.57–1)
EGFRCR SERPLBLD CKD-EPI 2021: 105.7 ML/MIN/1.73
ERYTHROCYTE [DISTWIDTH] IN BLOOD BY AUTOMATED COUNT: 12 % (ref 12.3–15.4)
GLOBULIN SER CALC-MCNC: 2.5 GM/DL
GLUCOSE SERPL-MCNC: 83 MG/DL (ref 65–99)
HCT VFR BLD AUTO: 42.4 % (ref 34–46.6)
HCV AB S/CO SERPL IA: 0.1 S/CO RATIO (ref 0–0.9)
HDLC SERPL-MCNC: 46 MG/DL (ref 40–60)
HGB BLD-MCNC: 14.1 G/DL (ref 12–15.9)
LDLC SERPL CALC-MCNC: 146 MG/DL (ref 0–100)
MCH RBC QN AUTO: 30.1 PG (ref 26.6–33)
MCHC RBC AUTO-ENTMCNC: 33.3 G/DL (ref 31.5–35.7)
MCV RBC AUTO: 90.4 FL (ref 79–97)
PLATELET # BLD AUTO: 273 10*3/MM3 (ref 140–450)
POTASSIUM SERPL-SCNC: 4.2 MMOL/L (ref 3.5–5.2)
PROT SERPL-MCNC: 7.3 G/DL (ref 6–8.5)
RBC # BLD AUTO: 4.69 10*6/MM3 (ref 3.77–5.28)
SODIUM SERPL-SCNC: 140 MMOL/L (ref 136–145)
TRIGL SERPL-MCNC: 98 MG/DL (ref 0–150)
VLDLC SERPL CALC-MCNC: 18 MG/DL (ref 5–40)
WBC # BLD AUTO: 6.34 10*3/MM3 (ref 3.4–10.8)

## 2022-04-29 PROCEDURE — 99213 OFFICE O/P EST LOW 20 MIN: CPT | Performed by: NURSE PRACTITIONER

## 2022-04-29 RX ORDER — CEPHALEXIN 500 MG/1
500 CAPSULE ORAL 3 TIMES DAILY
Qty: 21 CAPSULE | Refills: 0 | Status: SHIPPED | OUTPATIENT
Start: 2022-04-29 | End: 2022-05-25

## 2022-04-29 NOTE — TELEPHONE ENCOUNTER
Pharmacy Name:  [facility]    Pharmacy representative name: ARNEL     Pharmacy representative phone number: [phone]   FAX NUMBER [phone]  What medication are you calling in regards to: 15 MG DEXTROAMPHETAMINE       What question does the pharmacy have: STATES SHE NEEDS THE CLINICAL NOTES FOR THE PRIOR AUTH    Who is the provider that prescribed the medication: DR ELIZONDO     Additional notes: STATES THEY HAVE SENT OVER A FAX REQUEST ON April THE 27TH

## 2022-05-02 NOTE — TELEPHONE ENCOUNTER
I have contacted and spoke with patient about this. I am doing a SAFE report and I will speak with Kishore about this and will speak with Kishore's supervisor as well.

## 2022-05-04 NOTE — TELEPHONE ENCOUNTER
I have contacted patient and gave her my direct number. I have contacted Joby with [facility] to let him know of patient's concerns and he will address and see what he can do.

## 2022-05-25 ENCOUNTER — OFFICE VISIT (OUTPATIENT)
Dept: OBSTETRICS AND GYNECOLOGY | Facility: CLINIC | Age: 22
End: 2022-05-25

## 2022-05-25 VITALS
WEIGHT: 154 LBS | HEIGHT: 67 IN | BODY MASS INDEX: 24.17 KG/M2 | SYSTOLIC BLOOD PRESSURE: 118 MMHG | DIASTOLIC BLOOD PRESSURE: 78 MMHG

## 2022-05-25 DIAGNOSIS — N92.6 IRREGULAR PERIODS: ICD-10-CM

## 2022-05-25 DIAGNOSIS — Z01.419 WELL WOMAN EXAM WITH ROUTINE GYNECOLOGICAL EXAM: Primary | ICD-10-CM

## 2022-05-25 DIAGNOSIS — Z12.4 ENCOUNTER FOR SCREENING FOR CERVICAL CANCER: ICD-10-CM

## 2022-05-25 PROBLEM — E28.2 PCOS (POLYCYSTIC OVARIAN SYNDROME): Status: ACTIVE | Noted: 2022-05-25

## 2022-05-25 PROCEDURE — G0123 SCREEN CERV/VAG THIN LAYER: HCPCS | Performed by: NURSE PRACTITIONER

## 2022-05-25 PROCEDURE — 99395 PREV VISIT EST AGE 18-39: CPT | Performed by: NURSE PRACTITIONER

## 2022-05-25 PROCEDURE — 87624 HPV HI-RISK TYP POOLED RSLT: CPT | Performed by: NURSE PRACTITIONER

## 2022-05-25 PROCEDURE — 99213 OFFICE O/P EST LOW 20 MIN: CPT | Performed by: NURSE PRACTITIONER

## 2022-05-25 RX ORDER — PROGESTERONE 100 MG/1
100 CAPSULE ORAL DAILY
Qty: 10 CAPSULE | Refills: 12 | Status: SHIPPED | OUTPATIENT
Start: 2022-05-25

## 2022-05-25 NOTE — PROGRESS NOTES
"Chief Complaint   Patient presents with   • Gynecologic Exam     Pt here for annual and c/o irregular periods but reports it is an ongoing issue that the Progesterone she is taking has improved it, last pap 04/10/2019 normal, pt declines STD testing       History:  John Becker is a 22 y.o. female who presents today for follow-up for evaluation of the above:    HPI      John Becker is a 22 y.o. female ,  who comes to the office today for annual GYN examination. Last menstrual period was 05/16/2022 last Pap smear was 04/2019, and was normal. She has no history of cervical dysplasia. She is currently on progesterone for cycle regulation due to history of PCOS and is doing well with them without complaints. Her medical history is reviewed. Declines chlamydia screening today.          ROS:  Review of Systems   Constitutional: Negative for fatigue and unexpected weight change.   HENT: Negative.    Eyes: Negative.    Respiratory: Negative.    Cardiovascular: Negative.    Gastrointestinal: Negative for abdominal pain, constipation and diarrhea.   Endocrine: Negative.    Genitourinary: Negative for difficulty urinating, dyspareunia, genital sores, menstrual problem, pelvic pain, vaginal bleeding, vaginal discharge and vaginal pain.   Musculoskeletal: Negative.    Skin: Negative.    Neurological: Negative.    Psychiatric/Behavioral: Negative.        Ms. Becker  reports that she has never smoked. She has never used smokeless tobacco. She reports current alcohol use. She reports that she does not use drugs.      Current Outpatient Medications:   •  amphetamine-dextroamphetamine XR (Adderall XR) 15 MG 24 hr capsule, Take 1 capsule by mouth Every Morning, Disp: 30 capsule, Rfl: 0  •  Progesterone (Prometrium) 100 MG capsule, Take 1 capsule by mouth Daily., Disp: 10 capsule, Rfl: 12      OBJECTIVE:  /78 (BP Location: Left arm, Patient Position: Sitting, Cuff Size: Adult)   Ht 170.2 cm (67\")   Wt 69.9 kg " (154 lb)   LMP 05/16/2022 (Exact Date)   Breastfeeding No   BMI 24.12 kg/m²    Physical Exam  Constitutional:       Appearance: She is well-developed.   HENT:      Head: Normocephalic and atraumatic.   Eyes:      General: Lids are normal.      Conjunctiva/sclera: Conjunctivae normal.      Pupils: Pupils are equal, round, and reactive to light.   Neck:      Thyroid: No thyromegaly.   Cardiovascular:      Rate and Rhythm: Normal rate and regular rhythm.      Heart sounds: Normal heart sounds.   Pulmonary:      Effort: Pulmonary effort is normal.      Breath sounds: Normal breath sounds.   Chest:   Breasts: Breasts are symmetrical.      Right: No inverted nipple, mass, nipple discharge, skin change or tenderness.      Left: No inverted nipple, mass, nipple discharge, skin change or tenderness.       Abdominal:      General: Bowel sounds are normal.      Palpations: Abdomen is soft.   Genitourinary:     Exam position: Supine.      Labia:         Right: No rash, tenderness, lesion or injury.         Left: No rash, tenderness, lesion or injury.       Vagina: No signs of injury and foreign body. No vaginal discharge, erythema, tenderness or bleeding.      Cervix: No cervical motion tenderness, discharge or friability.      Uterus: Not deviated, not enlarged, not fixed and not tender.       Adnexa:         Right: No mass, tenderness or fullness.          Left: No mass, tenderness or fullness.        Rectum: Normal. No tenderness or external hemorrhoid.   Musculoskeletal:         General: Normal range of motion.      Cervical back: Normal range of motion and neck supple.   Skin:     General: Skin is warm and dry.   Neurological:      Mental Status: She is alert and oriented to person, place, and time.         Assessment/Plan    Diagnoses and all orders for this visit:    1. Well woman exam with routine gynecological exam (Primary)  Immunizations:      - Tetanus: Unknown or >10 years ago. Recommend to have at pharmacy or  on injury.      - Influenza: recommended annually      - Pneumovax:once after age 65      - Prevnar: Once after age 65      - Zostavax: Once after age 60  Colon Cancer Screening: Due at 45  Mammogram: Due at 40.  PAP: done today  DEXA: DEXA scan at 65  COVID vaccine information is available at vaccine.[place].gov   She did not complete her HPV vaccine series. Declines to restart at this time.     2. Encounter for screening for cervical cancer  -     Liquid-based Pap Smear, Screening    3. Irregular periods  -     Progesterone (Prometrium) 100 MG capsule; Take 1 capsule by mouth Daily.  Dispense: 10 capsule; Refill: 12  Chronic-Stable on current therapy      I have refilled her progesterone. We will notify her when the Pap smear results are available.  She will return in one year. In the meantime if she develops questions or problems, she will notify the office.         An After Visit Summary was printed and given to the patient at discharge.  Return in about 1 year (around 5/25/2023) for Annual physical. Sooner if problems arise.          Marlin Escudero APRN. 5/25/2022   Electronically Signed

## 2022-05-27 LAB
GEN CATEG CVX/VAG CYTO-IMP: NORMAL
HPV I/H RISK 4 DNA CVX QL PROBE+SIG AMP: NOT DETECTED
LAB AP CASE REPORT: NORMAL
LAB AP GYN ADDITIONAL INFORMATION: NORMAL
LAB AP GYN OTHER FINDINGS: NORMAL
Lab: NORMAL
PATH INTERP SPEC-IMP: NORMAL
STAT OF ADQ CVX/VAG CYTO-IMP: NORMAL

## 2022-06-13 ENCOUNTER — PATIENT MESSAGE (OUTPATIENT)
Dept: FAMILY MEDICINE CLINIC | Facility: CLINIC | Age: 22
End: 2022-06-13

## 2022-06-13 DIAGNOSIS — F90.9 ADULT ADHD: ICD-10-CM

## 2022-06-13 RX ORDER — DEXTROAMPHETAMINE SACCHARATE, AMPHETAMINE ASPARTATE MONOHYDRATE, DEXTROAMPHETAMINE SULFATE AND AMPHETAMINE SULFATE 3.75; 3.75; 3.75; 3.75 MG/1; MG/1; MG/1; MG/1
15 CAPSULE, EXTENDED RELEASE ORAL EVERY MORNING
Qty: 30 CAPSULE | Refills: 0 | Status: SHIPPED | OUTPATIENT
Start: 2022-06-13 | End: 2022-08-02 | Stop reason: SDUPTHER

## 2022-06-13 RX ORDER — DEXTROAMPHETAMINE SACCHARATE, AMPHETAMINE ASPARTATE MONOHYDRATE, DEXTROAMPHETAMINE SULFATE AND AMPHETAMINE SULFATE 3.75; 3.75; 3.75; 3.75 MG/1; MG/1; MG/1; MG/1
15 CAPSULE, EXTENDED RELEASE ORAL EVERY MORNING
Qty: 30 CAPSULE | Refills: 0 | OUTPATIENT
Start: 2022-06-13

## 2022-06-13 NOTE — TELEPHONE ENCOUNTER
From: John Becker  To: Dalia Oliveira MD  Sent: 6/13/2022 4:07 PM CDT  Subject: Medicine Refill Denial    Greetings! I received a message on [employer] saying my medicine could not be refilled, as there was a duplicate request. My pharmacy says their refill request was denied because it “was not appropriate” and suggested I contact my physician’s office. I’m just trying to get this straightened out (:    Thanks!  John Becker

## 2022-06-13 NOTE — TELEPHONE ENCOUNTER
Rx Refill Note  Requested Prescriptions     Pending Prescriptions Disp Refills   • amphetamine-dextroamphetamine XR (Adderall XR) 15 MG 24 hr capsule 30 capsule 0     Sig: Take 1 capsule by mouth Every Morning      Last office visit with prescribing clinician: 4/26/2022      Next office visit with prescribing clinician: 8/2/2022    Last refill: 4/26/2022     Mary Ellen Snyder MA  06/13/22, 15:55 CDT

## 2022-08-02 ENCOUNTER — OFFICE VISIT (OUTPATIENT)
Dept: FAMILY MEDICINE CLINIC | Facility: CLINIC | Age: 22
End: 2022-08-02

## 2022-08-02 VITALS
HEART RATE: 93 BPM | OXYGEN SATURATION: 99 % | TEMPERATURE: 97.8 F | WEIGHT: 153.6 LBS | HEIGHT: 67 IN | DIASTOLIC BLOOD PRESSURE: 92 MMHG | RESPIRATION RATE: 20 BRPM | SYSTOLIC BLOOD PRESSURE: 133 MMHG | BODY MASS INDEX: 24.11 KG/M2

## 2022-08-02 DIAGNOSIS — F90.9 ADULT ADHD: Primary | ICD-10-CM

## 2022-08-02 PROCEDURE — 99213 OFFICE O/P EST LOW 20 MIN: CPT | Performed by: FAMILY MEDICINE

## 2022-08-02 RX ORDER — DEXTROAMPHETAMINE SACCHARATE, AMPHETAMINE ASPARTATE MONOHYDRATE, DEXTROAMPHETAMINE SULFATE AND AMPHETAMINE SULFATE 3.75; 3.75; 3.75; 3.75 MG/1; MG/1; MG/1; MG/1
15 CAPSULE, EXTENDED RELEASE ORAL EVERY MORNING
Qty: 30 CAPSULE | Refills: 0 | Status: SHIPPED | OUTPATIENT
Start: 2022-08-02 | End: 2022-09-28 | Stop reason: SDUPTHER

## 2022-08-02 NOTE — PROGRESS NOTES
"Subjective cc: ADHD   John Becker is a 22 y.o. female.     History of Present Illness     The patient presents today for a follow-up on her ADHD.    The patient notes Adderall XR 15 mg is working well for her. She adds she is taking Adderall approximately \"two thirds of the time\". The patient complains of feeling \"faint\" when standing up while at work if she had not eaten and she is unsure if this is a side effect of Adderall XR. She adds she only experienced the feeling faint on 2 occassions while at work as described.    The following portions of the patient's history were reviewed and updated as appropriate: allergies, current medications, past family history, past medical history, past social history, past surgical history and problem list.        Review of Systems  A review of systems was performed, and the pertinent positives are noted in the HPI.    Objective   Blood pressure 133/92, pulse 93, temperature 97.8 °F (36.6 °C), temperature source Infrared, resp. rate 20, height 170.2 cm (67\"), weight 69.7 kg (153 lb 9.6 oz), SpO2 99 %, not currently breastfeeding.  Physical Exam  Vitals and nursing note reviewed.   Constitutional:       General: She is not in acute distress.     Appearance: She is well-developed. She is not diaphoretic.   HENT:      Head: Normocephalic and atraumatic.      Right Ear: External ear normal.      Left Ear: External ear normal.      Nose: Nose normal.   Eyes:      General:         Right eye: No discharge.         Left eye: No discharge.      Conjunctiva/sclera: Conjunctivae normal.   Neck:      Thyroid: No thyromegaly.      Trachea: No tracheal deviation.   Cardiovascular:      Rate and Rhythm: Regular rhythm. Tachycardia present.      Heart sounds: Normal heart sounds.   Pulmonary:      Effort: Pulmonary effort is normal. No respiratory distress.      Breath sounds: Normal breath sounds. No stridor. No wheezing.   Chest:      Chest wall: No tenderness.   Musculoskeletal:    "      General: Normal range of motion.      Cervical back: Normal range of motion.   Lymphadenopathy:      Cervical: No cervical adenopathy.   Skin:     General: Skin is warm and dry.   Neurological:      Mental Status: She is alert and oriented to person, place, and time.      Motor: No abnormal muscle tone.      Coordination: Coordination normal.   Psychiatric:         Behavior: Behavior normal.         Thought Content: Thought content normal.         Judgment: Judgment normal.         Assessment & Plan   Problems Addressed this Visit    None     Visit Diagnoses     Adult ADHD    -  Primary    Relevant Medications    amphetamine-dextroamphetamine XR (Adderall XR) 15 MG 24 hr capsule      Diagnoses       Codes Comments    Adult ADHD    -  Primary ICD-10-CM: F90.9  ICD-9-CM: 314.01         1. ADHD: Chronic, Controlled  - She will continue on current medication. No reported side effects and medication is effective when taken. SOLE is reviewed and appropriate. Controled substuance contract in chart. UDS is up-to-date. The patient is provided with a refill today. She will follow-up in 3 months, or sooner if needed.            Transcribed from ambient dictation for Dalia Oliveira MD by Linda Campos.  08/03/22   10:03 CDT    Patient verbalized consent to the visit recording.  I have personally performed the services described in this document as transcribed by the above individual, and it is both accurate and complete.  Dalia Oliveira MD  8/9/2022  15:56 CDT            This document has been electronically signed by Dalia Oliveira MD on August 9, 2022 15:56 CDT

## 2022-09-07 ENCOUNTER — TELEPHONE (OUTPATIENT)
Dept: FAMILY MEDICINE CLINIC | Facility: CLINIC | Age: 22
End: 2022-09-07

## 2022-09-07 NOTE — TELEPHONE ENCOUNTER
PA not needed at this time- PA on file covered until 11/2022   PRE-OP DIAGNOSIS:  Chronic middle ear effusion, bilateral 29-May-2019 16:19:15  Eric Jordan

## 2022-09-28 DIAGNOSIS — F90.9 ADULT ADHD: ICD-10-CM

## 2022-09-29 RX ORDER — DEXTROAMPHETAMINE SACCHARATE, AMPHETAMINE ASPARTATE MONOHYDRATE, DEXTROAMPHETAMINE SULFATE AND AMPHETAMINE SULFATE 3.75; 3.75; 3.75; 3.75 MG/1; MG/1; MG/1; MG/1
15 CAPSULE, EXTENDED RELEASE ORAL EVERY MORNING
Qty: 30 CAPSULE | Refills: 0 | Status: SHIPPED | OUTPATIENT
Start: 2022-09-29 | End: 2022-11-03 | Stop reason: SDUPTHER

## 2022-10-24 ENCOUNTER — TELEPHONE (OUTPATIENT)
Dept: FAMILY MEDICINE CLINIC | Facility: CLINIC | Age: 22
End: 2022-10-24

## 2022-10-27 ENCOUNTER — PATIENT MESSAGE (OUTPATIENT)
Dept: FAMILY MEDICINE CLINIC | Facility: CLINIC | Age: 22
End: 2022-10-27

## 2022-10-28 NOTE — TELEPHONE ENCOUNTER
From: John Becker  To: Dalia Oliveira MD  Sent: 10/27/2022 3:09 PM CDT  Subject: Upcoming Appointment     Greetings.     I am scheduled for an office visit this upcoming Thursday (November 3) to check up on how I am doing with my medicine and to be approved for refill. I am not experiencing any issues with my medicine, and I do not have any questions or concerns about it at this time.     I was curious to know if I could change this appointment to an e-visit, or if I even needed to speak with my doctor? If I still do, that is no problem.     Thank you,   John Becker

## 2022-11-03 ENCOUNTER — TELEMEDICINE (OUTPATIENT)
Dept: FAMILY MEDICINE CLINIC | Facility: CLINIC | Age: 22
End: 2022-11-03

## 2022-11-03 DIAGNOSIS — M25.50 POLYARTHRALGIA: Primary | ICD-10-CM

## 2022-11-03 DIAGNOSIS — F90.9 ADULT ADHD: ICD-10-CM

## 2022-11-03 PROCEDURE — 99214 OFFICE O/P EST MOD 30 MIN: CPT | Performed by: FAMILY MEDICINE

## 2022-11-03 RX ORDER — DEXTROAMPHETAMINE SACCHARATE, AMPHETAMINE ASPARTATE MONOHYDRATE, DEXTROAMPHETAMINE SULFATE AND AMPHETAMINE SULFATE 3.75; 3.75; 3.75; 3.75 MG/1; MG/1; MG/1; MG/1
15 CAPSULE, EXTENDED RELEASE ORAL EVERY MORNING
Qty: 30 CAPSULE | Refills: 0 | Status: SHIPPED | OUTPATIENT
Start: 2022-11-03 | End: 2023-01-03

## 2022-11-03 NOTE — TELEPHONE ENCOUNTER
Patient will be discharging tomorrow. Has chronic moreland that will be due for exchange 11/30, can you please schedule appt with nurse so that it prints on her AVS at discharge.     Can you please cancel 11/8 appt for cath change as that is not needed.       Thank you,  Zonia    Rx Refill Note  Requested Prescriptions     Pending Prescriptions Disp Refills   • amphetamine-dextroamphetamine XR (Adderall XR) 15 MG 24 hr capsule 30 capsule 0     Sig: Take 1 capsule by mouth Every Morning      Last office visit with prescribing clinician: 8/2/2022      Next office visit with prescribing clinician: 11/3/2022            Brynn Calvin MA  09/28/22, 12:12 CDT

## 2022-11-03 NOTE — PROGRESS NOTES
Subjective cc: ADHD re-check   John Becker is a 22 y.o. female who presents for follow up on ADHD via [facility]  She is present in her home, I am present in my office.     She reports her ADHD is controlled.     She does complain of multiple joint pains as well as being double jointed for her lifetime - concerned about autoimmune disorder      History of Present Illness     The following portions of the patient's history were reviewed and updated as appropriate: allergies, current medications, past family history, past medical history, past social history, past surgical history and problem list.        Review of Systems    Objective   not currently breastfeeding.  Physical Exam  Pulmonary:      Effort: Pulmonary effort is normal. No respiratory distress.   Neurological:      Mental Status: She is alert and oriented to person, place, and time.   Psychiatric:         Mood and Affect: Mood normal.         Behavior: Behavior normal.         Thought Content: Thought content normal.         Judgment: Judgment normal.         Assessment & Plan   Problems Addressed this Visit    None  Visit Diagnoses     Polyarthralgia    -  Primary    Relevant Orders    LILLIAM    Rheumatoid Factor, Quant    C-reactive protein    Sedimentation rate, automated    Adult ADHD        Relevant Medications    amphetamine-dextroamphetamine XR (Adderall XR) 15 MG 24 hr capsule      Diagnoses       Codes Comments    Polyarthralgia    -  Primary ICD-10-CM: M25.50  ICD-9-CM: 719.49     Adult ADHD     ICD-10-CM: F90.9  ICD-9-CM: 314.01         PLAN:     #1 polyarthralgia: new, needs further evaluation, will get labs and then referral to rheum if abnormal     #2 adult adhd: chronic, controlled, continue on current med[facility] barney reviewed, controlled contract in chart, UDS UTD     20 minutes           This document has been electronically signed by Dalia Oliveira MD on November 10, 2022 15:45 CST

## 2022-11-29 ENCOUNTER — PATIENT MESSAGE (OUTPATIENT)
Dept: FAMILY MEDICINE CLINIC | Facility: CLINIC | Age: 22
End: 2022-11-29

## 2022-11-29 DIAGNOSIS — M24.9 HYPERMOBILE JOINTS: ICD-10-CM

## 2022-11-29 DIAGNOSIS — M25.50 POLYARTHRALGIA: Primary | ICD-10-CM

## 2022-11-30 NOTE — TELEPHONE ENCOUNTER
From: John Becker  To: Dalia Oliveira MD  Sent: 11/29/2022 9:06 PM CST  Subject: Question regarding LILLIAM    Glad they are negative.   Do these results rule out all possibilities, such as EDS type III?

## 2023-01-02 DIAGNOSIS — F90.9 ADULT ADHD: ICD-10-CM

## 2023-01-03 RX ORDER — DEXTROAMPHETAMINE SACCHARATE, AMPHETAMINE ASPARTATE MONOHYDRATE, DEXTROAMPHETAMINE SULFATE AND AMPHETAMINE SULFATE 3.75; 3.75; 3.75; 3.75 MG/1; MG/1; MG/1; MG/1
15 CAPSULE, EXTENDED RELEASE ORAL EVERY MORNING
Qty: 30 CAPSULE | Refills: 0 | Status: SHIPPED | OUTPATIENT
Start: 2023-01-03

## 2023-01-03 NOTE — TELEPHONE ENCOUNTER
Rx Refill Note  Requested Prescriptions     Pending Prescriptions Disp Refills   • amphetamine-dextroamphetamine XR (ADDERALL XR) 15 MG 24 hr capsule [Pharmacy Med Name: DEXTROAMP-AMPHET ER 15 CAPS 15 Capsule] 30 capsule 0     Sig: Take 1 capsule by mouth Every Morning      Last office visit with prescribing clinician: 8/2/2022   Next office visit with prescribing clinician: 5/2/2023                         Would you like a call back once the refill request has been completed: [] Yes [] No    If the office needs to give you a call back, can they leave a voicemail: [] Yes [] No    Brynn Calvin MA  01/03/23, 09:07 CST

## 2023-04-26 ENCOUNTER — PATIENT MESSAGE (OUTPATIENT)
Dept: FAMILY MEDICINE CLINIC | Facility: CLINIC | Age: 23
End: 2023-04-26
Payer: COMMERCIAL

## 2023-04-26 NOTE — TELEPHONE ENCOUNTER
From: John Becker  To: Dalia Oliveira  Sent: 4/26/2023 1:55 PM CDT  Subject: Echocardiogram     Greetings.   The office of my physiologist, Dr. Jonny Paris, states that a doctor they are referring me to (Dr. Mckeon?) for further care of my symptoms wants me to have an echocardiogram done.     Dr. Paris’s office asked me to reach out to you to schedule me for one or to send me in the direction I need to go to have one done.     I do not know who Dr. Mckeon is, nor were they able to tell me. Any further intel would also be appreciated.     Thank you so much, call or message with any questions,  John Becker   [phone]

## 2023-04-27 DIAGNOSIS — M35.7 HYPERMOBILITY SYNDROME: Primary | ICD-10-CM

## 2023-04-27 NOTE — TELEPHONE ENCOUNTER
Called Dr. Paris's office to request copy of most recent office visit note for Dr. Oliveira to review. WILLIE ANTOINE for call back.

## 2024-11-08 ENCOUNTER — OFFICE VISIT (OUTPATIENT)
Age: 24
End: 2024-11-08
Payer: COMMERCIAL

## 2024-11-08 VITALS — HEIGHT: 66 IN | BODY MASS INDEX: 27.32 KG/M2 | WEIGHT: 170 LBS

## 2024-11-08 DIAGNOSIS — M25.312 SHOULDER JOINT INSTABILITY, LEFT: ICD-10-CM

## 2024-11-08 DIAGNOSIS — Q79.61 CLASSICAL EHLERS-DANLOS SYNDROME: Primary | ICD-10-CM

## 2024-11-08 PROCEDURE — 99204 OFFICE O/P NEW MOD 45 MIN: CPT | Performed by: ORTHOPAEDIC SURGERY

## 2024-11-08 RX ORDER — MEDROXYPROGESTERONE ACETATE 5 MG
5 TABLET ORAL DAILY
COMMUNITY
Start: 2024-01-03

## 2024-11-08 RX ORDER — DEXTROAMPHETAMINE SACCHARATE, AMPHETAMINE ASPARTATE MONOHYDRATE, DEXTROAMPHETAMINE SULFATE AND AMPHETAMINE SULFATE 5; 5; 5; 5 MG/1; MG/1; MG/1; MG/1
20 CAPSULE, EXTENDED RELEASE ORAL EVERY MORNING
COMMUNITY
Start: 2024-08-28
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
## 2024-11-27 ENCOUNTER — TELEPHONE (OUTPATIENT)
Age: 24
End: 2024-11-27

## 2024-11-27 NOTE — TELEPHONE ENCOUNTER
Patient needs a follow up appointment with Dr. Soares to talk about this. Per his office note, he advises against any surgical treatment.

## 2025-05-07 ENCOUNTER — HOSPITAL ENCOUNTER (OUTPATIENT)
Dept: PHYSICAL THERAPY | Age: 25
Setting detail: THERAPIES SERIES
Discharge: HOME | End: 2025-05-07
Payer: COMMERCIAL

## 2025-05-07 PROCEDURE — 97162 PT EVAL MOD COMPLEX 30 MIN: CPT

## 2025-05-07 ASSESSMENT — PAIN DESCRIPTION - DESCRIPTORS: DESCRIPTORS: ACHING;SHARP

## 2025-05-07 ASSESSMENT — PAIN DESCRIPTION - ORIENTATION: ORIENTATION: RIGHT;LEFT

## 2025-05-07 ASSESSMENT — PAIN DESCRIPTION - LOCATION: LOCATION: HIP;PELVIS;BACK

## 2025-05-07 ASSESSMENT — PAIN DESCRIPTION - PAIN TYPE: TYPE: CHRONIC PAIN;ACUTE PAIN

## 2025-05-07 ASSESSMENT — PAIN SCALES - GENERAL: PAINLEVEL_OUTOF10: 6
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
## 2025-05-19 ENCOUNTER — HOSPITAL ENCOUNTER (OUTPATIENT)
Dept: PHYSICAL THERAPY | Age: 25
Setting detail: THERAPIES SERIES
Discharge: HOME | End: 2025-05-19

## 2025-05-19 PROCEDURE — 97110 THERAPEUTIC EXERCISES: CPT

## 2025-05-19 ASSESSMENT — PAIN DESCRIPTION - LOCATION: LOCATION: BACK

## 2025-05-19 ASSESSMENT — PAIN DESCRIPTION - ORIENTATION: ORIENTATION: LOWER;MID

## 2025-05-19 ASSESSMENT — PAIN DESCRIPTION - PAIN TYPE: TYPE: CHRONIC PAIN

## 2025-05-19 NOTE — PROGRESS NOTES
Physical Therapy: Daily Note   Patient: Rome Draper (25 y.o. female)   Examination Date: 2025  Plan of Care/Certification Expiration Date: 25    No data recorded   :  2000 # of Visits since SOC:   2   MRN: [id number]  CSN: [id number] Start of Care Date:   2025   Insurance: Payor: /   Insurance ID: No Subscriber Number on File Secondary Insurance (if applicable):    Referring Physician: Curry Alves MD Walden, Paul MD   PCP: Joaquim Oliveira APRN Visits to Date/Visits Approved:     No Show/Cancelled Appts:   /       Medical Diagnosis: Jayda-Danlos syndrome, unspecified [Q79.60]  Acquired absence of both cervix and uterus [Z90.710]  Encounter for follow-up examination after completed treatment for conditions other than malignant neoplasm [Z09]    Treatment Diagnosis: Increased pelvic floor tension        SUBJECTIVE EXAMINATION   Pain Level: Pain Screening  Patient Currently in Pain: Denies  Pain Type: Chronic pain  Pain Location: Back  Pain Orientation: Lower, Mid    Patient Comments: Subjective: Doing about the same.  No pain at the mement.        TREATMENT     Exercises:      Treatment Reasoning    Exercise 1: Legs up the wall series, straight, V, figure 4 if gentle stretch in piriformis x 1 min each  Exercise 2: TA contraction alone 10 x 10 secUE 1 x 10, LE 1 x 10, UE/LE 1 x 10 , knee fall out 1 x 10, heel slide 1 x 10  Exercise 3: pelvic tilt 10 x 5 sec hold  Exercise 4: TA bridge with min bridge 1 x 10  Exercise 5: Happy baby x 1 min vs sitting on stool with forward lean  Exercise 6: Deep breathing-with biofeedback below  Exercise 7: Bilateral clamshells   1 x 10         sidelying IT band soft tissue work 1 x 3 mins each leg-muscle roller stick  Exercise 8: Quadruped birddog -NOT TODAY  Exercise 9: Quadruped block between knees then IR, add band around ankles as able-NOT TODAY  Exercise 10: Gentle prayer stretch-NOT TODAY  Exercise 11: tball TA contraction-NOT TODAY  Exercise

## 2025-05-21 ENCOUNTER — HOSPITAL ENCOUNTER (OUTPATIENT)
Dept: PHYSICAL THERAPY | Age: 25
Setting detail: THERAPIES SERIES
Discharge: HOME | End: 2025-05-21

## 2025-05-21 PROCEDURE — 97110 THERAPEUTIC EXERCISES: CPT

## 2025-05-21 PROCEDURE — 97140 MANUAL THERAPY 1/> REGIONS: CPT

## 2025-05-21 NOTE — PROGRESS NOTES
Treatment Recommendations: Strengthening, ROM, Functional mobility training, Pain management, Modalities, Manual, Therapeutic activities, Neuromuscular re-education, Home exercise program  Timed Code Treatment Minutes: 64 Minutes     Therapy Time   Individual Concurrent Group Co-treatment   Time In 1302         Time Out 1406         Minutes 64         Timed Code Treatment Minutes: 64 Minutes  Electronically signed by Mare Prakash PT on 5/21/2025 at 4:24 PM

## 2025-05-28 ENCOUNTER — HOSPITAL ENCOUNTER (OUTPATIENT)
Dept: PHYSICAL THERAPY | Age: 25
Setting detail: THERAPIES SERIES
Discharge: HOME | End: 2025-05-28

## 2025-05-28 PROCEDURE — 97110 THERAPEUTIC EXERCISES: CPT

## 2025-05-28 ASSESSMENT — PAIN DESCRIPTION - ORIENTATION: ORIENTATION: LOWER;MID

## 2025-05-28 ASSESSMENT — PAIN SCALES - GENERAL: PAINLEVEL_OUTOF10: 3

## 2025-05-28 ASSESSMENT — PAIN DESCRIPTION - DESCRIPTORS: DESCRIPTORS: ACHING;SORE

## 2025-05-28 ASSESSMENT — PAIN DESCRIPTION - LOCATION: LOCATION: BACK;HIP;ELBOW

## 2025-05-28 ASSESSMENT — PAIN DESCRIPTION - PAIN TYPE: TYPE: CHRONIC PAIN

## 2025-05-28 NOTE — PROGRESS NOTES
Physical Therapy: Daily Note   Patient: Rome Draper (25 y.o. female)   Examination Date: 2025  Plan of Care/Certification Expiration Date: 25    No data recorded   :  2000 # of Visits since SOC:   4   MRN: [id number]  CSN: [id number] Start of Care Date:   2025   Insurance: Payor: /   Insurance ID: No Subscriber Number on File Secondary Insurance (if applicable):    Referring Physician: Curry Alves MD Walden, Paul MD   PCP: Joaquim Oilveira APRN Visits to Date/Visits Approved:     No Show/Cancelled Appts:   /       Medical Diagnosis: Jayda-Danlos syndrome, unspecified [Q79.60]  Acquired absence of both cervix and uterus [Z90.710]  Encounter for follow-up examination after completed treatment for conditions other than malignant neoplasm [Z09]    Treatment Diagnosis: Increased pelvic floor tension        SUBJECTIVE EXAMINATION   Pain Level: Pain Screening  Patient Currently in Pain: Yes  Pain Assessment: 0-10  Pain Level: 3  Pain Type: Chronic pain  Pain Location: Back, Hip, Elbow  Pain Orientation: Lower, Mid  Pain Descriptors: Aching, Sore    Patient Comments: Subjective: Feeling dizzy today.  Otherwise, no probllems. No changes pelvic floor wise.        TREATMENT     Exercises:      Treatment Reasoning    Exercise 1: Legs up the wall series, straight, V, figure 4 if gentle stretch in piriformis x 1 min each             HEP issued 25  Exercise 2: TA contraction alone 10 x 10 secUE 1 x 10, LE 1 x 10, UE/LE 1 x 10 , knee fall out 1 x 10, heel slide 1 x 10  Exercise 3: pelvic tilt 10 x 5 sec hold  Exercise 4: TA bridge with min bridge 1 x 10  Exercise 5: Happy baby x 1 min vs sitting on stool with forward lean  Exercise 6: Deep breathing-with biofeedback below   not today  Exercise 7: Bilateral clamshells   1 x 10        sidelying IT band soft tissue work 1 x 3 mins each leg-muscle roller stick  Exercise 8: Quadruped birddog --NOT TODAY  Exercise 9: Quadruped block between

## 2025-06-02 ENCOUNTER — HOSPITAL ENCOUNTER (OUTPATIENT)
Dept: PHYSICAL THERAPY | Age: 25
Setting detail: THERAPIES SERIES
Discharge: HOME | End: 2025-06-02
Payer: COMMERCIAL

## 2025-06-02 PROCEDURE — 97140 MANUAL THERAPY 1/> REGIONS: CPT

## 2025-06-02 PROCEDURE — 97110 THERAPEUTIC EXERCISES: CPT

## 2025-06-02 ASSESSMENT — PAIN DESCRIPTION - DESCRIPTORS: DESCRIPTORS: ACHING

## 2025-06-02 ASSESSMENT — PAIN DESCRIPTION - LOCATION: LOCATION: BACK;ANKLE;FOOT

## 2025-06-02 ASSESSMENT — PAIN SCALES - GENERAL: PAINLEVEL_OUTOF10: 3

## 2025-06-02 ASSESSMENT — PAIN DESCRIPTION - PAIN TYPE: TYPE: CHRONIC PAIN

## 2025-06-02 ASSESSMENT — PAIN DESCRIPTION - ORIENTATION: ORIENTATION: RIGHT;LEFT;LOWER

## 2025-06-02 NOTE — PROGRESS NOTES
Physical Therapy: Daily Note   Patient: Rome Draper (25 y.o. female)   Examination Date: 2025  Plan of Care/Certification Expiration Date: 25    No data recorded   :  2000 # of Visits since SOC:   5   MRN: [id number]  CSN: [id number] Start of Care Date:   2025   Insurance: Payor: /   Insurance ID: No Subscriber Number on File Secondary Insurance (if applicable):    Referring Physician: Curry Alves MD Walden, Paul MD   PCP: Joaquim Oliveira APRN Visits to Date/Visits Approved:     No Show/Cancelled Appts:   /       Medical Diagnosis: Jayda-Danlos syndrome, unspecified [Q79.60]  Acquired absence of both cervix and uterus [Z90.710]  Encounter for follow-up examination after completed treatment for conditions other than malignant neoplasm [Z09]    Treatment Diagnosis: Increased pelvic floor tension        SUBJECTIVE EXAMINATION   Pain Level: Pain Screening  Patient Currently in Pain: Yes  Pain Assessment: 0-10  Pain Level: 3  Pain Type: Chronic pain  Pain Location: Back, Ankle, Foot  Pain Orientation: Right, Left, Lower  Pain Descriptors: Aching    Patient Comments: Subjective: I feel better than I did last session.        TREATMENT     Exercises:      Treatment Reasoning    Exercise 1: Legs up the wall series, straight, V, figure 4 if gentle stretch in piriformis x 1 min each             HEP issued 25  Exercise 2: TA contraction alone 10 x 10 secUE 1 x 10, LE 1 x 10, UE/LE 1 x 10 , knee fall out 1 x 10, heel slide 1 x 10  Exercise 3: pelvic tilt 10 x 5 sec hold  Exercise 4: TA bridge with min bridge 1 x 10  Exercise 5: Happy baby x 1 min vs sitting on stool with forward lean  Exercise 6: Deep breathing-with biofeedback below   not today  Exercise 7: Bilateral clamshells   1 x 10        sidelying IT band soft tissue work 1 x 3 mins each leg-muscle roller stick  Exercise 8: Quadruped birddog --NOT TODAY  Exercise 9: Quadruped block between knees then IR, add band around ankles

## 2025-06-04 ENCOUNTER — APPOINTMENT (OUTPATIENT)
Dept: PHYSICAL THERAPY | Age: 25
End: 2025-06-04
Payer: COMMERCIAL

## 2025-06-25 ENCOUNTER — HOSPITAL ENCOUNTER (OUTPATIENT)
Dept: PHYSICAL THERAPY | Age: 25
Setting detail: THERAPIES SERIES
Discharge: HOME | End: 2025-06-25
Payer: COMMERCIAL

## 2025-06-25 PROCEDURE — 97112 NEUROMUSCULAR REEDUCATION: CPT

## 2025-06-25 NOTE — PROGRESS NOTES
Physical Therapy  Daily Treatment Note  Date: 2025  Patient Name: Rome Draper  MRN: [id number]     :   2000    Subjective:      PT Visit Information  Onset Date: 25  PT Insurance Information: BCBS auth through [employer]; Eval + 11 visits of [id number], [id number], [id number] approved -  Total # of Visits Approved: 12  Total # of Visits to Date: 6  Plan of Care/Certification Expiration Date: 25  Progress Note Due Date: 25  Referring Provider (secondary): Curry Alves MD             Treatment Activities:                                    Exercises  Exercise 1: Legs up the wall series, straight, V, figure 4 if gentle stretch in piriformis x 1 min each             HEP issued 25  Exercise 2: TA contraction alone 10 x 10 secUE 1 x 10, LE 1 x 10, UE/LE 1 x 10 , knee fall out 1 x 10, heel slide 1 x 10  Exercise 3: pelvic tilt 10 x 5 sec hold  Exercise 4: TA bridge with min bridge 1 x 10  Exercise 5: Happy baby x 1 min vs sitting on stool with forward lean  Exercise 6: Deep breathing-with biofeedback below   not today  Exercise 7: Bilateral clamshells   1 x 10        sidelying IT band soft tissue work 1 x 3 mins each leg-muscle roller stick  Exercise 8: Quadruped birddog --NOT TODAY  Exercise 9: Quadruped block between knees then IR, add band around ankles as able-NOT TODAY  Exercise 10: Gentle prayer stretch- 3 x 10\"-NOT TODAY  Exercise 11: tball TA contraction-NOT TODAY  Exercise 12: tball press down with forearms to activate core-NOT TODAY  Exercise 13: min squat with ta contraction x 10-NOT TODAY  Exercise 14: sit to stand with ta contraction-1 x 10-NOT TODAY  Exercise 15: downtraining on biofeedback supine hooklying progress to more upright as able    X 6 MINS  BCBS patient not today  Exercise 16: internal manual therapy to bilateral pfms muscles including obturator internus, coccygeus, levator ani with sweeping motions and ischemic pressure as needed- x 12 minutes-  Exercise 17: pelvic floor